# Patient Record
Sex: FEMALE | URBAN - METROPOLITAN AREA
[De-identification: names, ages, dates, MRNs, and addresses within clinical notes are randomized per-mention and may not be internally consistent; named-entity substitution may affect disease eponyms.]

---

## 2023-06-23 ENCOUNTER — APPOINTMENT (OUTPATIENT)
Dept: URBAN - METROPOLITAN AREA CLINIC 314 | Age: 28
Setting detail: DERMATOLOGY
End: 2023-06-23

## 2023-06-23 DIAGNOSIS — L81.4 OTHER MELANIN HYPERPIGMENTATION: ICD-10-CM

## 2023-06-23 DIAGNOSIS — Z80.8 FAMILY HISTORY OF MALIGNANT NEOPLASM OF OTHER ORGANS OR SYSTEMS: ICD-10-CM

## 2023-06-23 DIAGNOSIS — Z12.83 ENCOUNTER FOR SCREENING FOR MALIGNANT NEOPLASM OF SKIN: ICD-10-CM

## 2023-06-23 DIAGNOSIS — D22 MELANOCYTIC NEVI: ICD-10-CM

## 2023-06-23 DIAGNOSIS — R52 PAIN, UNSPECIFIED: ICD-10-CM

## 2023-06-23 PROBLEM — D22.5 MELANOCYTIC NEVI OF TRUNK: Status: ACTIVE | Noted: 2023-06-23

## 2023-06-23 PROCEDURE — OTHER ADDITIONAL NOTES: OTHER

## 2023-06-23 PROCEDURE — OTHER PATIENT SPECIFIC COUNSELING: OTHER

## 2023-06-23 PROCEDURE — OTHER COUNSELING: OTHER

## 2023-06-23 PROCEDURE — OTHER PRESCRIPTION MEDICATION MANAGEMENT: OTHER

## 2023-06-23 PROCEDURE — 99203 OFFICE O/P NEW LOW 30 MIN: CPT

## 2023-06-23 PROCEDURE — OTHER PRESCRIPTION: OTHER

## 2023-06-23 PROCEDURE — OTHER MIPS QUALITY: OTHER

## 2023-06-23 PROCEDURE — OTHER EDUCATIONAL RESOURCES PROVIDED: OTHER

## 2023-06-23 RX ORDER — CLOBETASOL PROPIONATE 0.5 MG/ML
SOLUTION TOPICAL BID
Qty: 50 | Refills: 3 | Status: ERX | COMMUNITY
Start: 2023-06-23

## 2023-06-23 ASSESSMENT — LOCATION SIMPLE DESCRIPTION DERM
LOCATION SIMPLE: LEFT UPPER BACK
LOCATION SIMPLE: CHEST
LOCATION SIMPLE: SCALP

## 2023-06-23 ASSESSMENT — LOCATION ZONE DERM
LOCATION ZONE: TRUNK
LOCATION ZONE: SCALP

## 2023-06-23 ASSESSMENT — LOCATION DETAILED DESCRIPTION DERM
LOCATION DETAILED: LEFT SUPERIOR MEDIAL UPPER BACK
LOCATION DETAILED: MIDDLE STERNUM
LOCATION DETAILED: RIGHT SUPERIOR PARIETAL SCALP

## 2023-06-23 NOTE — PROCEDURE: PRESCRIPTION MEDICATION MANAGEMENT
Detail Level: Zone
Initiate Treatment: clobetasol 0.05 % scalp solution BID
Render In Strict Bullet Format?: No

## 2023-06-23 NOTE — PROCEDURE: ADDITIONAL NOTES
Additional Notes: No primary dermatitis. No cyst. Pt says tender. Will try steroid for 2 weeks. Can consider gabapentin or neurologist referral if not better. Pt has migraines and works as

## 2023-06-23 NOTE — PROCEDURE: ADDITIONAL NOTES
Additional Notes: Mother with melanoma , pt of [place] descent Additional Notes: Mother with melanoma , pt of [place] descent

## 2024-04-12 NOTE — HPI: HAIR LOSS
How Did The Hair Loss Occur?: gradual in onset
Is the patient currently in the state of [place]? YES    Visit mode:VIDEO    If the visit is dropped, the patient can be reconnected by: VIDEO VISIT: Text to cell phone:   Telephone Information:   Mobile [phone]       Will anyone else be joining the visit? NO  (If patient encounters technical issues they should call [phone]    How would you like to obtain your AVS? MyChart    Are changes needed to the allergy or medication list? Pt stated no med changes    Are refills needed on medications prescribed by this physician? NO    Reason for visit: RECHECK    No other vitals to report per pt    Angela ROSSF     
PT orders were placed and faxed to [facility], which is close to patients home, at [phone]  
How Severe Is Your Hair Loss?: moderate
What Hair Products Do You Use?: OTC Therapeutic shampoo